# Patient Record
Sex: FEMALE | URBAN - METROPOLITAN AREA
[De-identification: names, ages, dates, MRNs, and addresses within clinical notes are randomized per-mention and may not be internally consistent; named-entity substitution may affect disease eponyms.]

---

## 2022-01-26 ENCOUNTER — OFFICE VISIT (OUTPATIENT)
Dept: URBAN - METROPOLITAN AREA CLINIC 6 | Facility: CLINIC | Age: 82
End: 2022-01-26
Payer: COMMERCIAL

## 2022-01-26 DIAGNOSIS — H26.493 OTHER SECONDARY CATARACT, BILATERAL: ICD-10-CM

## 2022-01-26 DIAGNOSIS — E11.3293 DIABETES MELLITUS TYPE 2 WITH MILD NON-PROLIFERATIVE RETINOPATHY WITHOUT MACULAR EDEMA, BILATERAL: Primary | ICD-10-CM

## 2022-01-26 DIAGNOSIS — H43.813 BILATERAL VITREOUS DETACHMENT OF EYES: ICD-10-CM

## 2022-01-26 DIAGNOSIS — H35.3131 BILATERAL NONEXUDATIVE AGE-RELATED MACULAR DEGENERATION, EARLY DRY STAGE: ICD-10-CM

## 2022-01-26 DIAGNOSIS — H04.123 DRY EYE SYNDROME OF BILATERAL LACRIMAL GLANDS: ICD-10-CM

## 2022-01-26 DIAGNOSIS — H35.372 PUCKERING OF MACULA, LEFT EYE: ICD-10-CM

## 2022-01-26 PROCEDURE — 92014 COMPRE OPH EXAM EST PT 1/>: CPT | Performed by: OPTOMETRIST

## 2022-01-26 RX ORDER — ASPIRIN 325 MG/1
325 MG TABLET, COATED ORAL AS DIRECTED
Refills: 0 | Status: ACTIVE
Start: 2022-01-26

## 2022-01-26 RX ORDER — BENAZEPRIL HYDROCHLORIDE 10 MG/1
10 MG TABLET, COATED ORAL AS DIRECTED
Refills: 0 | Status: ACTIVE
Start: 2022-01-26

## 2022-01-26 RX ORDER — ENALAPRIL MALEATE 5 MG/1
5 MG TABLET ORAL AS DIRECTED
Refills: 0 | Status: ACTIVE
Start: 2022-01-26

## 2022-01-26 RX ORDER — NYSTATIN 100000 [USP'U]/G
CREAM TOPICAL AS DIRECTED
Refills: 0 | Status: ACTIVE
Start: 2022-01-26

## 2022-01-26 RX ORDER — LEVOTHYROXINE SODIUM 88 UG/1
TABLET ORAL AS DIRECTED
Refills: 0 | Status: ACTIVE
Start: 2022-01-26

## 2022-01-26 RX ORDER — HYDROCHLOROTHIAZIDE 25 MG/1
25 MG TABLET ORAL AS DIRECTED
Refills: 0 | Status: ACTIVE
Start: 2022-01-26

## 2022-01-26 RX ORDER — METOPROLOL SUCCINATE 50 MG/1
50 MG TABLET, FILM COATED, EXTENDED RELEASE ORAL AS DIRECTED
Refills: 0 | Status: ACTIVE
Start: 2022-01-26

## 2022-01-26 RX ORDER — METFORMIN HYDROCHLORIDE 500 MG/1
500 MG TABLET, FILM COATED ORAL AS DIRECTED
Refills: 0 | Status: ACTIVE
Start: 2022-01-26

## 2022-01-26 ASSESSMENT — INTRAOCULAR PRESSURE
OS: 19
OD: 19

## 2022-01-26 NOTE — IMPRESSION/PLAN
Impression: Puckering of macula, left eye: H35.372. Plan: Epiretinal membrane  - Plan to observe for now without intervention.

## 2022-01-26 NOTE — IMPRESSION/PLAN
Dating/viability u/s today.  EGA 8w1     Impression: Bilateral vitreous detachment of eyes: H43.813. Plan: Posterior vitreous detachment (floater)  - reviewed the signs and symptoms of possible retinal detachment (flashes, floaters, change in peripheral vision, etc). Advised to contact us immediately for any of these or other new symptoms.

## 2022-01-26 NOTE — IMPRESSION/PLAN
Impression: Bilateral nonexudative age-related macular degeneration, early dry stage: H35.3131. Plan: Dry Macular Degeneration both eyes - Discussed disease process with patient. Recommended AREDS2 vitamins with anti-oxidants and Amsler grid monitoring daily. Discussed avoiding cigarette smoke, including second-hand smoke. Return immediately for any worsening symptoms or change in vision.

## 2022-01-26 NOTE — IMPRESSION/PLAN
Impression: Diabetes mellitus Type 2 with mild non-proliferative retinopathy without macular edema, bilateral: D29.7852. Plan: Diabetes Mellitus Type II with mild Non-Proliferative Diabetic Retinopathy both eyes with absence of  macular edema - Discussed the pathophysiology of diabetes and its effect on the eye. Stressed the importance of strong glucose control. Advised of importance of scheduled dilated examinations, and to contact us immediately for any problems or concerns.

## 2023-01-27 ENCOUNTER — OFFICE VISIT (OUTPATIENT)
Dept: URBAN - METROPOLITAN AREA CLINIC 6 | Facility: CLINIC | Age: 83
End: 2023-01-27
Payer: COMMERCIAL

## 2023-01-27 DIAGNOSIS — H35.3131 BILATERAL NONEXUDATIVE AGE-RELATED MACULAR DEGENERATION, EARLY DRY STAGE: ICD-10-CM

## 2023-01-27 DIAGNOSIS — H26.493 OTHER SECONDARY CATARACT, BILATERAL: ICD-10-CM

## 2023-01-27 DIAGNOSIS — H35.372 PUCKERING OF MACULA, LEFT EYE: ICD-10-CM

## 2023-01-27 DIAGNOSIS — E11.3293 DIABETES MELLITUS TYPE 2 WITH MILD NON-PROLIFERATIVE RETINOPATHY WITHOUT MACULAR EDEMA, BILATERAL: Primary | ICD-10-CM

## 2023-01-27 DIAGNOSIS — H04.123 DRY EYE SYNDROME OF BILATERAL LACRIMAL GLANDS: ICD-10-CM

## 2023-01-27 DIAGNOSIS — H43.813 BILATERAL VITREOUS DETACHMENT OF EYES: ICD-10-CM

## 2023-01-27 PROCEDURE — 92134 CPTRZ OPH DX IMG PST SGM RTA: CPT | Performed by: OPTOMETRIST

## 2023-01-27 PROCEDURE — 92014 COMPRE OPH EXAM EST PT 1/>: CPT | Performed by: OPTOMETRIST

## 2023-01-27 ASSESSMENT — INTRAOCULAR PRESSURE
OS: 17
OD: 19

## 2023-01-27 NOTE — IMPRESSION/PLAN
Impression: Bilateral nonexudative age-related macular degeneration, early dry stage: H35.3131. Plan: Dry Macular Degeneration both eyes - mOCT flat OU. Discussed disease process with patient. Recommended AREDS2 vitamins with anti-oxidants and Amsler grid monitoring daily. Discussed avoiding cigarette smoke, including second-hand smoke. Return immediately for any worsening symptoms or change in vision.

## 2023-01-27 NOTE — IMPRESSION/PLAN
Impression: Diabetes mellitus Type 2 with mild non-proliferative retinopathy without macular edema, bilateral: E56.3251. Plan: Diabetes Mellitus Type II with mild Non-Proliferative Diabetic Retinopathy both eyes with absence of  macular edema - Discussed the pathophysiology of diabetes and its effect on the eye. Stressed the importance of strong glucose control. Advised of importance of scheduled dilated examinations, and to contact us immediately for any problems or concerns.

## 2023-01-27 NOTE — IMPRESSION/PLAN
Impression: Puckering of macula, left eye: H35.372. Plan: Epiretinal membrane left eye - Plan to observe for now without intervention.

## 2023-02-07 ENCOUNTER — TESTING ONLY (OUTPATIENT)
Dept: URBAN - METROPOLITAN AREA CLINIC 6 | Facility: CLINIC | Age: 83
End: 2023-02-07

## 2023-02-07 DIAGNOSIS — H52.03 HYPERMETROPIA, BILATERAL: Primary | ICD-10-CM

## 2023-02-07 ASSESSMENT — VISUAL ACUITY
OS: 20/40
OD: 20/100

## 2024-03-27 ENCOUNTER — OFFICE VISIT (OUTPATIENT)
Dept: URBAN - METROPOLITAN AREA CLINIC 6 | Facility: CLINIC | Age: 84
End: 2024-03-27
Payer: COMMERCIAL

## 2024-03-27 DIAGNOSIS — H26.493 OTHER SECONDARY CATARACT, BILATERAL: ICD-10-CM

## 2024-03-27 DIAGNOSIS — H43.813 BILATERAL VITREOUS DETACHMENT OF EYES: ICD-10-CM

## 2024-03-27 DIAGNOSIS — H35.372 PUCKERING OF MACULA, LEFT EYE: ICD-10-CM

## 2024-03-27 DIAGNOSIS — E11.3293 DIABETES MELLITUS TYPE 2 WITH MILD NON-PROLIFERATIVE RETINOPATHY WITHOUT MACULAR EDEMA, BILATERAL: Primary | ICD-10-CM

## 2024-03-27 PROCEDURE — 92134 CPTRZ OPH DX IMG PST SGM RTA: CPT | Performed by: OPTOMETRIST

## 2024-03-27 PROCEDURE — 92014 COMPRE OPH EXAM EST PT 1/>: CPT | Performed by: OPTOMETRIST

## 2024-03-27 ASSESSMENT — INTRAOCULAR PRESSURE
OD: 16
OS: 14

## 2025-03-26 ENCOUNTER — OFFICE VISIT (OUTPATIENT)
Dept: URBAN - METROPOLITAN AREA CLINIC 6 | Facility: CLINIC | Age: 85
End: 2025-03-26
Payer: COMMERCIAL

## 2025-03-26 DIAGNOSIS — H26.493 OTHER SECONDARY CATARACT, BILATERAL: ICD-10-CM

## 2025-03-26 DIAGNOSIS — E11.9 DIABETES MELLITUS TYPE 2 WITHOUT MENTION OF COMPLICATION: Primary | ICD-10-CM

## 2025-03-26 DIAGNOSIS — H43.813 BILATERAL VITREOUS DETACHMENT OF EYES: ICD-10-CM

## 2025-03-26 DIAGNOSIS — H52.03 HYPERMETROPIA, BILATERAL: ICD-10-CM

## 2025-03-26 DIAGNOSIS — H35.372 PUCKERING OF MACULA, LEFT EYE: ICD-10-CM

## 2025-03-26 DIAGNOSIS — H35.3131 BILATERAL NONEXUDATIVE AGE-RELATED MACULAR DEGENERATION, EARLY DRY STAGE: ICD-10-CM

## 2025-03-26 PROCEDURE — 92014 COMPRE OPH EXAM EST PT 1/>: CPT | Performed by: OPTOMETRIST

## 2025-03-26 ASSESSMENT — VISUAL ACUITY
OD: 20/200
OS: 20/40

## 2025-03-26 ASSESSMENT — INTRAOCULAR PRESSURE
OD: 14
OS: 16

## 2025-07-28 ENCOUNTER — OFFICE VISIT (OUTPATIENT)
Dept: URBAN - METROPOLITAN AREA CLINIC 6 | Facility: CLINIC | Age: 85
End: 2025-07-28
Payer: COMMERCIAL

## 2025-07-28 DIAGNOSIS — H00.012 HORDEOLUM EXTERNUM RIGHT LOWER EYELID: Primary | ICD-10-CM

## 2025-07-28 PROCEDURE — 99213 OFFICE O/P EST LOW 20 MIN: CPT | Performed by: OPTOMETRIST

## 2025-07-28 RX ORDER — NEOMYCIN SULFATE, POLYMYXIN B SULFATE AND DEXAMETHASONE 1; 3.5; 1 MG/G; MG/G; [USP'U]/G
OINTMENT OPHTHALMIC
Qty: 3.5 | Refills: 0 | Status: ACTIVE
Start: 2025-07-28

## 2025-07-28 ASSESSMENT — INTRAOCULAR PRESSURE
OS: 13
OD: 9